# Patient Record
Sex: MALE | Race: OTHER | HISPANIC OR LATINO | URBAN - METROPOLITAN AREA
[De-identification: names, ages, dates, MRNs, and addresses within clinical notes are randomized per-mention and may not be internally consistent; named-entity substitution may affect disease eponyms.]

---

## 2021-09-15 ENCOUNTER — HOSPITAL ENCOUNTER (EMERGENCY)
Facility: HOSPITAL | Age: 36
Discharge: HOME | End: 2021-09-15
Attending: EMERGENCY MEDICINE | Admitting: EMERGENCY MEDICINE

## 2021-09-15 VITALS
DIASTOLIC BLOOD PRESSURE: 87 MMHG | TEMPERATURE: 98.7 F | OXYGEN SATURATION: 97 % | SYSTOLIC BLOOD PRESSURE: 141 MMHG | RESPIRATION RATE: 18 BRPM | HEART RATE: 63 BPM | WEIGHT: 210 LBS

## 2021-09-15 DIAGNOSIS — R51.9 HEADACHE: Primary | ICD-10-CM

## 2021-09-15 PROCEDURE — 99283 EMERGENCY DEPT VISIT LOW MDM: CPT

## 2021-09-15 PROCEDURE — 99281 EMR DPT VST MAYX REQ PHY/QHP: CPT | Performed by: EMERGENCY MEDICINE

## 2021-09-15 NOTE — ED PROVIDER NOTES
History  Chief Complaint   Patient presents with    Headache     as of today pt feels a severe headache with throbbing feeling in the back of the head and/or temporal area  Pt denies and vision changes or trauma      51-year-old male headache no reported past history coming in today with headache  Reports this headache has been intermittent most days over the last 3 years  Reports presenting today due to increased intensity and frequency over the past 3 weeks  States the pain is pressure like and occasionally sharp primarily around right parietal area and occasionally behind right eye without associated vision changes  No maximal in onset and not the worst headache of his life  Denies any associated neuro deficits  Denies any change in discomfort temporarily such as early in the day or with positional changes such as sitting or lying flat  Does report some recent weight loss but was intentional loss  No associated night sweats or fevers  No associated vomiting  Patient reports occasionally taking tylenol but reports her does not like to take medication  Currently asymptomatic and reports head discomfort resolved a short while prior to evaluation  Denies any vision changes, CP, SOB, abd pain, nausea/vomiting, fevers/chills, or any bladder or bowel changes  None       Past Medical History:   Diagnosis Date    No known problems        Past Surgical History:   Procedure Laterality Date    CIRCUMCISION      last year 2020       History reviewed  No pertinent family history  I have reviewed and agree with the history as documented  E-Cigarette/Vaping     E-Cigarette/Vaping Substances     Social History     Tobacco Use    Smoking status: Never Smoker    Smokeless tobacco: Never Used   Substance Use Topics    Alcohol use: Yes     Comment: rare    Drug use: Yes     Types: Marijuana        Review of Systems   Constitutional: Negative for appetite change, chills, diaphoresis and fever     HENT: Negative for congestion, rhinorrhea and sore throat  Eyes: Negative for photophobia and visual disturbance  Respiratory: Negative for chest tightness and shortness of breath  Cardiovascular: Negative for chest pain and leg swelling  Gastrointestinal: Negative for abdominal distention, abdominal pain, blood in stool, constipation, diarrhea, nausea and vomiting  Genitourinary: Negative for difficulty urinating and dysuria  Musculoskeletal: Negative for back pain and joint swelling  Skin: Negative for rash  Neurological: Positive for headaches  Negative for dizziness, weakness, light-headedness and numbness  Psychiatric/Behavioral: Negative for agitation and confusion  All other systems reviewed and are negative  Physical Exam  ED Triage Vitals [09/15/21 1825]   Temperature Pulse Respirations Blood Pressure SpO2   98 7 °F (37 1 °C) 63 18 141/87 97 %      Temp Source Heart Rate Source Patient Position - Orthostatic VS BP Location FiO2 (%)   Tympanic Monitor Sitting Left arm --      Pain Score       --             Orthostatic Vital Signs  Vitals:    09/15/21 1825   BP: 141/87   Pulse: 63   Patient Position - Orthostatic VS: Sitting       Physical Exam  Vitals and nursing note reviewed  Constitutional:       General: He is not in acute distress  Appearance: Normal appearance  He is well-developed  He is not ill-appearing, toxic-appearing or diaphoretic  HENT:      Head: Normocephalic and atraumatic  Nose: Nose normal  No congestion or rhinorrhea  Mouth/Throat:      Mouth: Mucous membranes are moist       Pharynx: Oropharynx is clear  No oropharyngeal exudate or posterior oropharyngeal erythema  Eyes:      General: No scleral icterus  Right eye: No discharge  Left eye: No discharge  Extraocular Movements: Extraocular movements intact  Conjunctiva/sclera: Conjunctivae normal       Pupils: Pupils are equal, round, and reactive to light     Neck:      Vascular: No JVD       Trachea: No tracheal deviation  Cardiovascular:      Rate and Rhythm: Normal rate and regular rhythm  Heart sounds: Normal heart sounds  No murmur heard  No friction rub  No gallop  Comments: Normal rate and regular rhythm  Pulmonary:      Effort: Pulmonary effort is normal  No respiratory distress  Breath sounds: Normal breath sounds  No stridor  No wheezing or rales  Comments: Clear to auscultation bilaterally  Chest:      Chest wall: No tenderness  Abdominal:      General: Bowel sounds are normal  There is no distension  Palpations: Abdomen is soft  Tenderness: There is no abdominal tenderness  There is no right CVA tenderness, left CVA tenderness, guarding or rebound  Comments: Soft, nontender, nondistended  Normal bowel sounds throughout   Musculoskeletal:         General: No swelling, tenderness, deformity or signs of injury  Normal range of motion  Cervical back: Normal range of motion and neck supple  No rigidity  No muscular tenderness  Right lower leg: No edema  Left lower leg: No edema  Lymphadenopathy:      Cervical: No cervical adenopathy  Skin:     General: Skin is warm and dry  Coloration: Skin is not pale  Findings: No erythema or rash  Neurological:      General: No focal deficit present  Mental Status: He is alert and oriented to person, place, and time  Mental status is at baseline  Cranial Nerves: No cranial nerve deficit  Sensory: No sensory deficit  Motor: No weakness or abnormal muscle tone  Coordination: Coordination normal       Gait: Gait normal       Comments: A&Ox3 to person, place, and time  CN 2-12 intact  Strength 5/5 throughout  Sensation grossly intact  Cerebellar exam including gait intact  Psychiatric:         Behavior: Behavior normal          Thought Content:  Thought content normal          ED Medications  Medications - No data to display    Diagnostic Studies  Results Reviewed     None                 No orders to display         Procedures  Procedures      ED Course                             SBIRT 20yo+      Most Recent Value   SBIRT (24 yo +)   In order to provide better care to our patients, we are screening all of our patients for alcohol and drug use  Would it be okay to ask you these screening questions? No Filed at: 09/15/2021 1921                MDM  Number of Diagnoses or Management Options  Headache  Diagnosis management comments: 82-year-old male headache no reported past history coming in today with headache  Currently asymptomatic and without neuro changes or early morning headache or b symptoms  No trauma or anticoagulation  CT very low yield  Patient refusing medications at this time and requesting recommendations  Advised f/u PCP and neurology and referral placed  Medication recommendations  Given instructions and return precautions  All questions answered  Patient acknowledged understanding of all written and verbal instructions and return precautions  Discharged  Amount and/or Complexity of Data Reviewed  Clinical lab tests: reviewed  Tests in the radiology section of CPT®: reviewed  Tests in the medicine section of CPT®: reviewed  Review and summarize past medical records: yes  Independent visualization of images, tracings, or specimens: yes    Risk of Complications, Morbidity, and/or Mortality  Presenting problems: low  Diagnostic procedures: low  Management options: low    Patient Progress  Patient progress: resolved      Disposition  Final diagnoses:   Headache     Time reflects when diagnosis was documented in both MDM as applicable and the Disposition within this note     Time User Action Codes Description Comment    9/15/2021  7:19 PM Nasreen Weinstein Add [R51 9] Headache       ED Disposition     ED Disposition Condition Date/Time Comment    Discharge Stable Wed Sep 15, 2021  7:22 PM Fredi Kehr discharge to home/self care              Follow-up Information     Follow up With Specialties Details Why Contact Info Additional [place]  Schedule an appointment as soon as possible for a visit  [phone]  [facility] [address]     [address] [place] [address] 34 [facility] Emergency Department Emergency Medicine Go to  If symptoms worsen [address]  [address] [facility] [address] Emergency Department, [address], [facility] 108    [facility] Neurology [facility] Neurology Schedule an appointment as soon as possible for a visit in 1 week  [address]  121 [facility] Neurology [facility], [address], [place], 300 [facility]          There are no discharge medications for this patient  PDMP Review     None           ED Provider  Attending physically available and evaluated Jordon Soto I managed the patient along with the ED Attending      Electronically Signed by         Mikki Zafar MD  09/16/21 2753

## 2021-09-15 NOTE — DISCHARGE INSTRUCTIONS
Please follow-up primary care provider and Neurology  Recommend Tylenol 650 mg and ibuprofen 600 mg every 6 hours as needed for pain  Also recommend 400 mg magnesium every day for the next few weeks  Please return for severely worsening headaches, profound weakness, profound sensory changes, fainting, or any other concerning signs or symptoms  Please refer to the following documents for additional instructions and return precautions

## 2021-09-15 NOTE — ED NOTES
Patient reports that he is pain-free and would like to be discharged        Erin Wade RN  09/15/21 7502

## 2021-09-15 NOTE — ED ATTENDING ATTESTATION
9/15/2021  Uriah SKY MD, saw and evaluated the patient  I have discussed the patient with the resident/non-physician practitioner and agree with the resident's/non-physician practitioner's findings, Plan of Care, and MDM as documented in the resident's/non-physician practitioner's note, except where noted  All available labs and Radiology studies were reviewed  I was present for key portions of any procedure(s) performed by the resident/non-physician practitioner and I was immediately available to provide assistance  At this point I agree with the current assessment done in the Emergency Department  I have conducted an independent evaluation of this patient a history and physical is as follows:  Patient with daily headache, chronic and longstanding  Patient states he wakes with it in the morning, but it never wakes him up  States that waxes and wanes in intensity, and can be on either side of his head  No double or blurry vision, changes in concentration, numbness, tingling, or weakness  Denies trauma, neck pain, fevers, or chills  No immunosuppression  Patient has never been seen for the headache  States that it is becoming more recurrent, although overall less intense  He does not take anything for the headache  Review of systems otherwise -12 systems reviewed  On exam Vital signs were reviewed  Patient is awake, alert, interactive  The patient's pupils are equally round reactive to light  Oropharynx is clear with moist mucous membranes  Neck is supple and nontender with no adenopathy or JVD  Heart is regular with no murmurs, rubs, or gallops  Lungs are clear and equal with no wheezes, rales, or rhonchi  Abdomen is soft and nontender with no masses, rebound, or guarding  There is no CVA tenderness  The patient was completely exposed  There is no skin breakdown  There are no rashes or skin changes  Extremities are warm and well perfused with good pulses   The patient has normal strength, sensation, and cranial nerves  Impression:  Primary headache disorder    Plan to treat symptomatically, referred to Neurology, recommend that patient use a headache diary  ED Course         Critical Care Time  Procedures

## 2021-09-16 ENCOUNTER — TELEPHONE (OUTPATIENT)
Dept: NEUROLOGY | Facility: CLINIC | Age: 36
End: 2021-09-16

## 2021-09-16 NOTE — TELEPHONE ENCOUNTER
Best contact number for patient:  [phone]  Emergency Contact name and number:  None  Referring provider and telephone number:  Romelia Ryder [phone]  Primary Care Provider Name and if affiliated with [facility]:   none  Reason for Appointment/Dx:  headaches  Have you seen and followed up with a pediatric Neurologist for this disease in the past?      No (If yes ok to schedule with Dr José Godinez)    Neurology Location patient would like to be seen:  any  Order received? Yes                                                 Records Received? Yes    Have you ever seen another Neurologist?       No    Insurance Information    Insurance Name:[facility]   ID/Policy #:[id number]      Secondary Insurance:    ID/Policy#: Workman's Comp/ Accident/ School  Information      Workman's Comp/Accident/School related?        No    If yes name of Insurance company:    Claim #:    Date of Injury:    Type of Injury:     Name and Telephone Number:    Notes:                   Appointment date:   4/21/2022

## 2021-09-16 NOTE — LETTER
Dante Aguiar  [address] [facility]  [place] [address]      Dear Mr Duron Faustino,    [address] [facility] office has been unsuccessful in trying to reach you by phone regarding:    Office appointment scheduled April 21st, 2022 at 1 p m  Please contact our office at your earliest convenience  Please call [phone]  and follow the prompts        Sincerely,      [facility]

## 2022-04-15 ENCOUNTER — TELEPHONE (OUTPATIENT)
Dept: NEUROLOGY | Facility: CLINIC | Age: 37
End: 2022-04-15

## 2022-04-15 NOTE — TELEPHONE ENCOUNTER
Attempt made to contact patient to finish registration prior to his 4/21/2022 @ 1 pm appointment with Dr Ky Mesa, however, patient's phone number on file is no longer in service  Letter generated and mailed out

## 2022-04-18 ENCOUNTER — TELEPHONE (OUTPATIENT)
Dept: NEUROLOGY | Facility: CLINIC | Age: 37
End: 2022-04-18

## 2022-04-18 NOTE — TELEPHONE ENCOUNTER
Attempted to call patient to confirm his 4/21/2022 @ 1 pm appointment, however, patient's phone number, [phone], is no longer in service

## 2022-04-21 ENCOUNTER — CONSULT (OUTPATIENT)
Dept: NEUROLOGY | Facility: CLINIC | Age: 37
End: 2022-04-21
Payer: COMMERCIAL

## 2022-04-21 VITALS
HEIGHT: 67 IN | SYSTOLIC BLOOD PRESSURE: 110 MMHG | WEIGHT: 200 LBS | BODY MASS INDEX: 31.39 KG/M2 | HEART RATE: 80 BPM | DIASTOLIC BLOOD PRESSURE: 70 MMHG

## 2022-04-21 DIAGNOSIS — R51.9 HEADACHE: ICD-10-CM

## 2022-04-21 PROCEDURE — 99205 OFFICE O/P NEW HI 60 MIN: CPT | Performed by: PSYCHIATRY & NEUROLOGY

## 2022-04-21 RX ORDER — PREDNISONE 10 MG/1
TABLET ORAL
Qty: 18 TABLET | Refills: 0 | Status: SHIPPED | OUTPATIENT
Start: 2022-04-21

## 2022-04-21 NOTE — PROGRESS NOTES
Patient ID: Lula Malhotra is a 31 y o  male  Assessment/Plan:    Headache  Patient is a very pleasant 24-year-old L handed male with history of head trauma and hearing loss who presents for evaluation of headaches  No report of migrainous features  No clear exacerbating/ alleviating factors or concerns for elevated ICP  Patient mostly concerned because of family history of brain tumor  Exam unremarkable with exception of a positive hoffmans sign  Plan:  · Headache possibly due to an occipital neuralgia   · Ordered a short prednisone taper   · Recommended over the counter magnesium oxide   · Can continue taking tylenol as abortive as this has been helping   · MRI with and without contrast ordered  · Counseled on lifestyle modifications   · Given positive hoffmans sign, may consider Imaging of the C spine in the future   · Follow up in 6 months        Diagnoses and all orders for this visit:    Headache  -     Ambulatory referral to Neurology  -     MRI brain with and without contrast; Future  -     predniSONE 10 mg tablet; 3 tabs daily for 3 days, then 2 tabs daily for 3 days, then 1 tab daily for 3 days           Subjective:    HPI     Patient is a very pleasant 24-year-old L handed male with history of head trauma and hearing loss who presents for evaluation of headaches  Patient reports that at baseline gets a left-sided sharp/stabbing infrequent headache that he used to attribute to his history of head trauma as a child  In 2020 patient started  Having 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  Headache located along the superior right parietal area  In August of 2021 his headache became daily for 3 months  Usually worse at night  Went to of 2021 headache was more controlled  Since January of this year he has been getting them more frequently and more painful  Slightly more anterior on the frontal area in both R and L, alternates   As well as more posterior only on the R side  At times behind the eye  Occur 3-4 times a week  The more anterior headaches are described as stabbing whereas the more posterior headache is described as throbbing  He denies any radiation  No triggers or identifiable exacerbating factors  No identifiable alleviating factors  On average 5-6/10, worst 10/10  Denies photophobia, nausea, vomiting, changes in vision, weakness, numbness /tingling, confusion or speech difficulties  No worsening with changes in position  Denies phonophobia, but yarbrough states loug noises make "his ears hurt"  Patient is  hard of hearing  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  He states that it became more pronounced at age of 34-28  Patient reports 3 weeks of right-sided neck pain  Headache is typically well controlled with Tylenol  Reports normal childhood development  Grandmother with history of "cysts in head"  Cousin on his mothers side with history of brain tumor, unclear which  Lifestyle:  Sleeping: uses melatonin, with this gets 6 H,difficulty both falling asleep and staying asleep  Snores  Wakes up tired  Water: 5-6 bottles   Alcohol: No  Smoke: no   Drugs: Marijuana  Occupation: Used to drive [employer]  Denies history of DVT/PE  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[place] [address]  The following portions of the patient's history were reviewed and updated as appropriate: allergies, current medications, past family history, past medical history, past social history, past surgical history and problem list and review of systems  Objective:    Blood pressure 110/70, pulse 80, height 5' 7" (1 702 m), weight 90 7 kg (200 lb)  Physical Exam  Eyes:      General: Lids are normal       Extraocular Movements: Extraocular movements intact  Pupils: Pupils are equal, round, and reactive to light  Neurological:      Coordination: Romberg sign negative        Deep Tendon Reflexes: Strength normal       Reflex Scores: Bicep reflexes are 2+ on the right side and 2+ on the left side  Brachioradialis reflexes are 2+ on the right side and 2+ on the left side  Patellar reflexes are 2+ on the right side and 2+ on the left side  Achilles reflexes are 2+ on the right side and 2+ on the left side  Neurological Exam  Mental Status  Awake, alert and oriented to person, place and time  Cranial Nerves  CN II: Visual acuity is normal  Visual fields full to confrontation  CN III, IV, VI: Extraocular movements intact bilaterally  Normal lids and orbits bilaterally  Pupils equal round and reactive to light bilaterally  CN V: Facial sensation is normal   CN VII: Full and symmetric facial movement  CN VIII: Hearing is normal   CN IX, X: Palate elevates symmetrically  Normal gag reflex  CN XI: Shoulder shrug strength is normal   CN XII: Tongue midline without atrophy or fasciculations  Motor   Strength is 5/5 throughout all four extremities  Reflexes                                           Right                      Left  Brachioradialis                    2+                         2+  Biceps                                 2+                         2+  Patellar                                2+                         2+  Achilles                                2+                         2+    Right pathological reflexes: Crossed adductor absent  Ankle clonus absent  Left pathological reflexes: Crossed adductor absent  Ankle clonus absent  Positive leo   Coordination  Right: Finger-to-nose normal   Left: Finger-to-nose normal     Gait  Casual gait is normal including stance, stride, and arm swing  Normal tandem gait  Romberg is absent  Able to rise from chair without using arms  ROS:    Review of Systems   Constitutional: Negative  Negative for appetite change and fever  HENT: Negative  Negative for hearing loss, tinnitus, trouble swallowing and voice change  Eyes: Negative  Negative for photophobia and pain  Respiratory: Negative  Negative for shortness of breath  Cardiovascular: Negative  Negative for palpitations  Gastrointestinal: Negative  Negative for nausea and vomiting  Endocrine: Negative  Negative for cold intolerance  Genitourinary: Negative  Negative for dysuria, frequency and urgency  Musculoskeletal: Negative  Negative for myalgias and neck pain  Skin: Negative  Negative for rash  Neurological: Negative  Negative for dizziness, tremors, seizures, syncope, facial asymmetry, speech difficulty, weakness, light-headedness, numbness and headaches  Hematological: Negative  Does not bruise/bleed easily  Psychiatric/Behavioral: Negative  Negative for confusion, hallucinations and sleep disturbance

## 2022-04-22 PROBLEM — R51.9 HEADACHE: Status: ACTIVE | Noted: 2022-04-22

## 2022-04-22 NOTE — ASSESSMENT & PLAN NOTE
Patient is a very pleasant 79-year-old L handed male with history of head trauma and hearing loss who presents for evaluation of headaches  No report of migrainous features  No clear exacerbating/ alleviating factors or concerns for elevated ICP  Patient mostly concerned because of family history of brain tumor  Exam unremarkable with exception of a positive hoffmans sign      Plan:  · Headache possibly due to an occipital neuralgia   · Ordered a short prednisone taper   · Recommended over the counter magnesium oxide   · Can continue taking tylenol as abortive as this has been helping   · MRI with and without contrast ordered  · Counseled on lifestyle modifications   · Given positive hoffmans sign, may consider Imaging of the C spine in the future   · Follow up in 6 months

## 2022-05-25 ENCOUNTER — HOSPITAL ENCOUNTER (OUTPATIENT)
Dept: RADIOLOGY | Facility: HOSPITAL | Age: 37
Discharge: HOME | End: 2022-05-25
Payer: COMMERCIAL

## 2022-05-25 DIAGNOSIS — R51.9 HEADACHE: ICD-10-CM

## 2022-05-25 PROCEDURE — 70553 MRI BRAIN STEM W/O & W/DYE: CPT

## 2022-05-25 PROCEDURE — G1004 CDSM NDSC: HCPCS

## 2022-05-25 PROCEDURE — A9585 GADOBUTROL INJECTION: HCPCS | Performed by: PSYCHIATRY & NEUROLOGY

## 2022-05-25 RX ADMIN — GADOBUTROL 9 ML: 604.72 INJECTION INTRAVENOUS at 05:58

## 2022-06-08 ENCOUNTER — TELEPHONE (OUTPATIENT)
Dept: NEUROLOGY | Facility: CLINIC | Age: 37
End: 2022-06-08

## 2022-06-09 NOTE — TELEPHONE ENCOUNTER
Pt made aware of below  Call then had bad connection and call disconnected        Attempted to call pt back, left message to call office if he had any further questions

## 2025-01-10 ENCOUNTER — HOSPITAL ENCOUNTER (EMERGENCY)
Facility: HOSPITAL | Age: 40
Discharge: HOME | End: 2025-01-10
Attending: EMERGENCY MEDICINE
Payer: COMMERCIAL

## 2025-01-10 ENCOUNTER — APPOINTMENT (EMERGENCY)
Dept: RADIOLOGY | Facility: HOSPITAL | Age: 40
End: 2025-01-10
Payer: COMMERCIAL

## 2025-01-10 VITALS
DIASTOLIC BLOOD PRESSURE: 57 MMHG | OXYGEN SATURATION: 96 % | HEART RATE: 86 BPM | SYSTOLIC BLOOD PRESSURE: 116 MMHG | RESPIRATION RATE: 22 BRPM | TEMPERATURE: 99.9 F

## 2025-01-10 DIAGNOSIS — E86.0 DEHYDRATION: ICD-10-CM

## 2025-01-10 DIAGNOSIS — R55 SYNCOPE: ICD-10-CM

## 2025-01-10 DIAGNOSIS — J10.1 INFLUENZA A: Primary | ICD-10-CM

## 2025-01-10 LAB
ALBUMIN SERPL BCG-MCNC: 4.6 G/DL (ref 3.5–5)
ALP SERPL-CCNC: 48 U/L (ref 34–104)
ALT SERPL W P-5'-P-CCNC: 29 U/L (ref 7–52)
ANION GAP SERPL CALCULATED.3IONS-SCNC: 10 MMOL/L (ref 4–13)
AST SERPL W P-5'-P-CCNC: 36 U/L (ref 13–39)
ATRIAL RATE: 86 BPM
BASOPHILS # BLD MANUAL: 0 THOUSAND/UL (ref 0–0.1)
BASOPHILS NFR MAR MANUAL: 0 % (ref 0–1)
BILIRUB SERPL-MCNC: 0.88 MG/DL (ref 0.2–1)
BUN SERPL-MCNC: 19 MG/DL (ref 5–25)
CALCIUM SERPL-MCNC: 9.5 MG/DL (ref 8.4–10.2)
CHLORIDE SERPL-SCNC: 99 MMOL/L (ref 96–108)
CO2 SERPL-SCNC: 27 MMOL/L (ref 21–32)
CREAT SERPL-MCNC: 1.38 MG/DL (ref 0.6–1.3)
EOSINOPHIL # BLD MANUAL: 0.21 THOUSAND/UL (ref 0–0.4)
EOSINOPHIL NFR BLD MANUAL: 3 % (ref 0–6)
ERYTHROCYTE [DISTWIDTH] IN BLOOD BY AUTOMATED COUNT: 12.5 % (ref 11.6–15.1)
FLUAV AG UPPER RESP QL IA.RAPID: POSITIVE
FLUBV AG UPPER RESP QL IA.RAPID: NEGATIVE
GFR SERPL CREATININE-BSD FRML MDRD: 64 ML/MIN/1.73SQ M
GLUCOSE SERPL-MCNC: 146 MG/DL (ref 65–140)
HCT VFR BLD AUTO: 50.3 % (ref 36.5–49.3)
HGB BLD-MCNC: 17.8 G/DL (ref 12–17)
LYMPHOCYTES # BLD AUTO: 0.57 THOUSAND/UL (ref 0.6–4.47)
LYMPHOCYTES # BLD AUTO: 5 % (ref 14–44)
MCH RBC QN AUTO: 30.6 PG (ref 26.8–34.3)
MCHC RBC AUTO-ENTMCNC: 35.4 G/DL (ref 31.4–37.4)
MCV RBC AUTO: 87 FL (ref 82–98)
MONOCYTES # BLD AUTO: 0.64 THOUSAND/UL (ref 0–1.22)
MONOCYTES NFR BLD: 9 % (ref 4–12)
NEUTROPHILS # BLD MANUAL: 5.69 THOUSAND/UL (ref 1.85–7.62)
NEUTS BAND NFR BLD MANUAL: 16 % (ref 0–8)
NEUTS SEG NFR BLD AUTO: 64 % (ref 43–75)
P AXIS: 68 DEGREES
PLATELET # BLD AUTO: 147 THOUSANDS/UL (ref 149–390)
PLATELET BLD QL SMEAR: ADEQUATE
PMV BLD AUTO: 10.8 FL (ref 8.9–12.7)
POTASSIUM SERPL-SCNC: 3.6 MMOL/L (ref 3.5–5.3)
PR INTERVAL: 126 MS
PROT SERPL-MCNC: 8.4 G/DL (ref 6.4–8.4)
QRS AXIS: 46 DEGREES
QRSD INTERVAL: 88 MS
QT INTERVAL: 322 MS
QTC INTERVAL: 385 MS
RBC # BLD AUTO: 5.81 MILLION/UL (ref 3.88–5.62)
RBC MORPH BLD: NORMAL
SARS-COV+SARS-COV-2 AG RESP QL IA.RAPID: NEGATIVE
SODIUM SERPL-SCNC: 136 MMOL/L (ref 135–147)
T WAVE AXIS: 59 DEGREES
VARIANT LYMPHS # BLD AUTO: 3 %
VENTRICULAR RATE: 86 BPM
WBC # BLD AUTO: 7.11 THOUSAND/UL (ref 4.31–10.16)

## 2025-01-10 PROCEDURE — 99285 EMERGENCY DEPT VISIT HI MDM: CPT | Performed by: EMERGENCY MEDICINE

## 2025-01-10 PROCEDURE — 85027 COMPLETE CBC AUTOMATED: CPT | Performed by: EMERGENCY MEDICINE

## 2025-01-10 PROCEDURE — 93005 ELECTROCARDIOGRAM TRACING: CPT

## 2025-01-10 PROCEDURE — 85007 BL SMEAR W/DIFF WBC COUNT: CPT | Performed by: EMERGENCY MEDICINE

## 2025-01-10 PROCEDURE — 99284 EMERGENCY DEPT VISIT MOD MDM: CPT

## 2025-01-10 PROCEDURE — 87811 SARS-COV-2 COVID19 W/OPTIC: CPT | Performed by: EMERGENCY MEDICINE

## 2025-01-10 PROCEDURE — 87804 INFLUENZA ASSAY W/OPTIC: CPT | Performed by: EMERGENCY MEDICINE

## 2025-01-10 PROCEDURE — 71046 X-RAY EXAM CHEST 2 VIEWS: CPT

## 2025-01-10 PROCEDURE — 36415 COLL VENOUS BLD VENIPUNCTURE: CPT | Performed by: EMERGENCY MEDICINE

## 2025-01-10 PROCEDURE — 96374 THER/PROPH/DIAG INJ IV PUSH: CPT

## 2025-01-10 PROCEDURE — 80053 COMPREHEN METABOLIC PANEL: CPT | Performed by: EMERGENCY MEDICINE

## 2025-01-10 PROCEDURE — 96361 HYDRATE IV INFUSION ADD-ON: CPT

## 2025-01-10 RX ORDER — KETOROLAC TROMETHAMINE 30 MG/ML
15 INJECTION, SOLUTION INTRAMUSCULAR; INTRAVENOUS ONCE
Status: COMPLETED | OUTPATIENT
Start: 2025-01-10 | End: 2025-01-10

## 2025-01-10 RX ORDER — OSELTAMIVIR PHOSPHATE 75 MG/1
75 CAPSULE ORAL EVERY 12 HOURS
Qty: 10 CAPSULE | Refills: 0 | Status: SHIPPED | OUTPATIENT
Start: 2025-01-10 | End: 2025-01-15

## 2025-01-10 RX ADMIN — SODIUM CHLORIDE 1000 ML: 0.9 INJECTION, SOLUTION INTRAVENOUS at 08:42

## 2025-01-10 RX ADMIN — KETOROLAC TROMETHAMINE 15 MG: 30 INJECTION, SOLUTION INTRAMUSCULAR; INTRAVENOUS at 09:43

## 2025-01-10 NOTE — ED NOTES
Orthostatic vital signs   Lying- 139/67 HR 91  Sitting- 140/75 HR 89  Standing- 139/78 HR 98     Nat Mann  01/10/25 0871

## 2025-01-10 NOTE — ED PROVIDER NOTES
"Time reflects when diagnosis was documented in both MDM as applicable and the Disposition within this note       Time User Action Codes Description Comment    1/10/2025  9:06 AM Juan Templeton Add [J10.1] Influenza A     1/10/2025  9:06 AM Juan Templeton Add [R55] Syncope     1/10/2025  9:06 AM Juan Templeton Add [E86.0] Dehydration           ED Disposition       ED Disposition   Discharge    Condition   Stable    Date/Time   Fri Herbert 10, 2025  9:06 AM    Comment   Quintin Diallo discharge to home/self care.                   Assessment & Plan       Medical Decision Making  Background: 39 y.o. male presents with uri symptoms, syncope    Differential DX includes but is not limited to: viral disease, pneumonia, bronchitis, dehydration, arrhythmia, metabolic derangement, doubt acs, doubt pe     Plan: cbc, cmp, EKG, viral swab, chest xray, symptomatic treatment       Amount and/or Complexity of Data Reviewed  Labs: ordered.  Radiology: ordered.    Risk  Prescription drug management.        ED Course as of 01/10/25 0946   Fri Herbert 10, 2025   0906 Patient is clinically dehydrated (dry mucus membranes, borderline tachycardia).  Will treat with IVF pending lab results.        Medications   sodium chloride 0.9 % bolus 1,000 mL (1,000 mL Intravenous New Bag 1/10/25 0842)   ketorolac (TORADOL) injection 15 mg (15 mg Intravenous Given 1/10/25 0943)       ED Risk Strat Scores                                              History of Present Illness       Chief Complaint   Patient presents with    Syncope     Pt reports \"I thought I layed down to sleep, but my brother says he heard a thud so I must have passed out\" pt tachypnic and generally feels unwell.        Past Medical History:   Diagnosis Date    No known problems       Past Surgical History:   Procedure Laterality Date    CIRCUMCISION      last year 2020      History reviewed. No pertinent family history.   Social History     Tobacco Use    Smoking status: Never    Smokeless " tobacco: Never   Substance Use Topics    Alcohol use: Yes     Comment: rare    Drug use: Yes     Types: Marijuana     Comment: daily      E-Cigarette/Vaping      E-Cigarette/Vaping Substances      I have reviewed and agree with the history as documented.     Quintin is a 39 y.o. male who presents for evaluation after a syncopal episode at home.  He reports waking up this morning feeling very lightheaded and had a dry mouth.  He went down to his kitchen and drank some water and juice but continued to feel lightheaded. He then syncopized.  His brother helped him up.  No post-episode confusion.  No tremors or seizure like activity. He has been having uri symptoms for a few days.   He took some otc medications prior to sleep.  He reports prodromal symptoms of feeling flushed/sweaty and sob prior to syncope.        History provided by:  Patient   used: No    Syncope  Episode history:  Single  Most recent episode:  Today  Associated symptoms: fever        Review of Systems   Constitutional:  Positive for fatigue and fever.   HENT:  Positive for congestion.    Respiratory:  Positive for cough.    Cardiovascular:  Positive for syncope.   Neurological:  Positive for syncope and light-headedness.           Objective       ED Triage Vitals [01/10/25 0817]   Temperature Pulse Blood Pressure Respirations SpO2 Patient Position - Orthostatic VS   99.9 °F (37.7 °C) 95 136/78 (!) 28 97 % Lying      Temp Source Heart Rate Source BP Location FiO2 (%) Pain Score    Oral Monitor Right arm -- No Pain      Vitals      Date and Time Temp Pulse SpO2 Resp BP Pain Score FACES Pain Rating User   01/10/25 0834 -- 98 -- -- 139/78 -- -- TA   01/10/25 0832 -- 89 -- -- 140/75 -- -- TA   01/10/25 0831 -- 91 -- -- 139/67 -- -- TA   01/10/25 0817 99.9 °F (37.7 °C) 95 97 % 28 136/78 No Pain -- RJK            Physical Exam  Vitals and nursing note reviewed.   Constitutional:       General: He is in acute distress.      Appearance: He  is well-developed.   HENT:      Head: Normocephalic and atraumatic.      Comments: Hard of Hearing     Mouth/Throat:      Mouth: Mucous membranes are dry.   Eyes:      Pupils: Pupils are equal, round, and reactive to light.   Neck:      Vascular: No JVD.   Cardiovascular:      Rate and Rhythm: Normal rate and regular rhythm.      Heart sounds: Normal heart sounds. No murmur heard.     No friction rub. No gallop.   Pulmonary:      Effort: Pulmonary effort is normal. No respiratory distress.      Breath sounds: Normal breath sounds. No wheezing or rales.   Chest:      Chest wall: No tenderness.   Musculoskeletal:         General: No tenderness. Normal range of motion.      Cervical back: Normal range of motion.   Skin:     General: Skin is warm and dry.   Neurological:      General: No focal deficit present.      Mental Status: He is alert and oriented to person, place, and time.   Psychiatric:         Behavior: Behavior normal.         Thought Content: Thought content normal.         Judgment: Judgment normal.         Results Reviewed       Procedure Component Value Units Date/Time    Comprehensive metabolic panel [[id number]]  (Abnormal) Collected: 01/10/25 0842    Lab Status: Final result Specimen: Blood from Arm, Right Updated: 01/10/25 0903     Sodium 136 mmol/L      Potassium 3.6 mmol/L      Chloride 99 mmol/L      CO2 27 mmol/L      ANION GAP 10 mmol/L      BUN 19 mg/dL      Creatinine 1.38 mg/dL      Glucose 146 mg/dL      Calcium 9.5 mg/dL      AST 36 U/L      ALT 29 U/L      Alkaline Phosphatase 48 U/L      Total Protein 8.4 g/dL      Albumin 4.6 g/dL      Total Bilirubin 0.88 mg/dL      eGFR 64 ml/min/1.73sq m     Narrative:      National Kidney Disease Foundation guidelines for Chronic Kidney Disease (CKD):     Stage 1 with normal or high GFR (GFR > 90 mL/min/1.73 square meters)    Stage 2 Mild CKD (GFR = 60-89 mL/min/1.73 square meters)    Stage 3A Moderate CKD (GFR = 45-59 mL/min/1.73 square meters)     Stage 3B Moderate CKD (GFR = 30-44 mL/min/1.73 square meters)    Stage 4 Severe CKD (GFR = 15-29 mL/min/1.73 square meters)    Stage 5 End Stage CKD (GFR <15 mL/min/1.73 square meters)  Note: GFR calculation is accurate only with a steady state creatinine    FLU/COVID Rapid Antigen (30 min. TAT) - Preferred screening test in ED [[id number]]  (Abnormal) Collected: 01/10/25 0831    Lab Status: Final result Specimen: Nares from Nose Updated: 01/10/25 0856     SARS COV Rapid Antigen Negative     Influenza A Rapid Antigen Positive     Influenza B Rapid Antigen Negative    Narrative:      This test has been performed using the [employer] Hilda 2 FLU+SARS Antigen test under the Emergency Use Authorization (EUA). This test has been validated by the  and verified by the performing laboratory. The Hilda uses lateral flow immunofluorescent sandwich assay to detect SARS-COV, Influenza A and Influenza B Antigen.     The [employer] Hilda 2 SARS Antigen test does not differentiate between SARS-CoV and SARS-CoV-2.     Negative results are presumptive and may be confirmed with a molecular assay, if necessary, for patient management. Negative results do not rule out SARS-CoV-2 or influenza infection and should not be used as the sole basis for treatment or patient management decisions. A negative test result may occur if the level of antigen in a sample is below the limit of detection of this test.     Positive results are indicative of the presence of viral antigens, but do not rule out bacterial infection or co-infection with other viruses.     All test results should be used as an adjunct to clinical observations and other information available to the provider.    FOR PEDIATRIC PATIENTS - copy/paste COVID Guidelines URL to browser: [email or web address]    CBC and differential [[id number]]  (Abnormal) Collected: 01/10/25 0842    Lab Status: Preliminary result Specimen: Blood  from Arm, Right Updated: 01/10/25 0849     WBC 7.11 Thousand/uL      RBC 5.81 Million/uL      Hemoglobin 17.8 g/dL      Hematocrit 50.3 %      MCV 87 fL      MCH 30.6 pg      MCHC 35.4 g/dL      RDW 12.5 %      MPV 10.8 fL      Platelets 147 Thousands/uL             XR chest 2 views   ED Interpretation by Juan Templeton MD (01/10 0916)   This film was interpreted independently by me.  No infiltrate, cardiac silhouette normal, no pleural effusions or pulmonary edema.         Final Interpretation by Jonatan Rebolledo MD (01/10 0905)      No acute cardiopulmonary disease.            Workstation performed: [id number]             ECG 12 Lead Documentation Only    Date/Time: 1/10/2025 9:11 AM    Performed by: Juan Templeton MD  Authorized by: Juan Templeton MD    Indications / Diagnosis:  Syncope  ECG reviewed by me, the ED Provider: yes    Patient location:  ED  Previous ECG:     Previous ECG:  Unavailable  Interpretation:     Interpretation: normal    Rate:     ECG rate:  86    ECG rate assessment: normal    Rhythm:     Rhythm: sinus rhythm    Ectopy:     Ectopy: none    QRS:     QRS axis:  Normal    QRS intervals:  Normal  Conduction:     Conduction: normal    ST segments:     ST segments:  Normal  T waves:     T waves: normal        ED Medication and Procedure Management   Prior to Admission Medications   Prescriptions Last Dose Informant Patient Reported? Taking?   predniSONE 10 mg tablet   No No   Sig: 3 tabs daily for 3 days, then 2 tabs daily for 3 days, then 1 tab daily for 3 days      Facility-Administered Medications: None     Patient's Medications   Discharge Prescriptions    OSELTAMIVIR (TAMIFLU) 75 MG CAPSULE    Take 1 capsule (75 mg total) by mouth every 12 (twelve) hours for 5 days       Start Date: 1/10/2025 End Date: 1/15/2025       Order Dose: 75 mg       Quantity: 10 capsule    Refills: 0     No discharge procedures on file.  ED SEPSIS DOCUMENTATION   Time reflects when diagnosis was  documented in both MDM as applicable and the Disposition within this note       Time User Action Codes Description Comment    1/10/2025  9:06 AM Juan Templeton [J10.1] Influenza A     1/10/2025  9:06 AM Juan Templeton [R55] Syncope     1/10/2025  9:06 AM Juan Templeton [E86.0] Dehydration                  Juan Templeton MD  01/10/25 0947

## 2025-01-10 NOTE — Clinical Note
Quintin Diallo was seen and treated in our emergency department on 1/10/2025.    No restrictions            Diagnosis: Acute Influenza A    Quintin  may return to work on return date.    He may return on this date: 01/13/2025         If you have any questions or concerns, please don't hesitate to call.      Juan Templeton MD    ______________________________           _______________          _______________  Hospital Representative                              Date                                Time

## 2025-01-21 ENCOUNTER — HOSPITAL ENCOUNTER (EMERGENCY)
Facility: HOSPITAL | Age: 40
Discharge: HOME | End: 2025-01-21
Attending: EMERGENCY MEDICINE
Payer: COMMERCIAL

## 2025-01-21 VITALS
SYSTOLIC BLOOD PRESSURE: 143 MMHG | RESPIRATION RATE: 16 BRPM | DIASTOLIC BLOOD PRESSURE: 85 MMHG | OXYGEN SATURATION: 97 % | TEMPERATURE: 98.2 F | HEART RATE: 84 BPM

## 2025-01-21 DIAGNOSIS — H10.9 BILATERAL CONJUNCTIVITIS: Primary | ICD-10-CM

## 2025-01-21 PROCEDURE — 99284 EMERGENCY DEPT VISIT MOD MDM: CPT | Performed by: EMERGENCY MEDICINE

## 2025-01-21 PROCEDURE — 99282 EMERGENCY DEPT VISIT SF MDM: CPT

## 2025-01-21 RX ORDER — TETRACAINE HYDROCHLORIDE 5 MG/ML
1 SOLUTION OPHTHALMIC ONCE
Status: COMPLETED | OUTPATIENT
Start: 2025-01-21 | End: 2025-01-21

## 2025-01-21 RX ORDER — OFLOXACIN 3 MG/ML
1 SOLUTION/ DROPS OPHTHALMIC 4 TIMES DAILY
Qty: 10 ML | Refills: 0 | Status: SHIPPED | OUTPATIENT
Start: 2025-01-21

## 2025-01-21 RX ADMIN — TETRACAINE HYDROCHLORIDE 1 DROP: 5 SOLUTION OPHTHALMIC at 07:34

## 2025-01-21 RX ADMIN — FLUORESCEIN SODIUM 2 STRIP: 1 STRIP OPHTHALMIC at 07:34

## 2025-01-21 NOTE — ED PROVIDER NOTES
Time reflects when diagnosis was documented in both MDM as applicable and the Disposition within this note       Time User Action Codes Description Comment    1/21/2025  7:39 AM Juan Templeton Add [H10.9] Bilateral conjunctivitis           ED Disposition       ED Disposition   Discharge    Condition   Stable    Date/Time   Tue Jan 21, 2025  8:07 AM    Comment   Quintin Diallo discharge to home/self care.                   Assessment & Plan       Medical Decision Making  Patient presents with:  Bilateral eye swelling, itchiness that started yesterday at 6 pm. Patient states yellow discharge and crusting that occurs when first wakes up. Patient has been taking saline eye drops without relief. No pertinent PMH. 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 Patient sleeps with his dog in the same bed. Patient smokes marijuana once a day. Denies illicit substance use or ETOH use. Patient had the flu 2 weeks ago that resolved 1 week ago. Patient denies change in vision, pain with eye movement.    Patient seen and examined noted to have bilateral injected conjunctiva, no foreign bodies, 20/20 vision bilaterally, and normal eye pressures bilaterally.      Differential diagnosis includes but is not limited to viral conjunctivitis, bacterial conjunctivitis, ultraviolet keratitis.       Patient appears well, is nontoxic appearing, Quintin Diallo expresses understanding and agrees with plan of care at this time.  In light of this patient would benefit from outpatient management.    Patient was rx'd as below       Risk  Prescription drug management.             Medications   tetracaine 0.5 % ophthalmic solution 1 drop (1 drop Both Eyes Given 1/21/25 0734)   fluorescein sodium sterile ophthalmic strip 2 strip (2 strips Both Eyes Given 1/21/25 0734)       ED Risk Strat Scores                                              History of Present Illness        Chief Complaint   Patient presents with    Eye Redness     Pt reports onset of eye redness and discharge onset last night, worsening today       Past Medical History:   Diagnosis Date    No known problems       Past Surgical History:   Procedure Laterality Date    CIRCUMCISION      last year 2020      History reviewed. No pertinent family history.   Social History     Tobacco Use    Smoking status: Never    Smokeless tobacco: Never   Substance Use Topics    Alcohol use: Yes     Comment: rare    Drug use: Yes     Types: Marijuana     Comment: daily      E-Cigarette/Vaping      E-Cigarette/Vaping Substances      I have reviewed and agree with the history as documented.     Quintin Diallo is a 39 y.o. male     They presented to the emergency department on January 21, 2025. Patient presents with:  Bilateral eye swelling, itchiness that started yesterday at 6 pm. Patient states yellow discharge and crusting that occurs when first wakes up. Patient has been taking saline eye drops without relief. No pertinent PMH. 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 Patient sleeps with his dog in the same bed. Patient smokes marijuana once a day. Denies illicit substance use or ETOH use. Patient had the flu 2 weeks ago that resolved 1 week ago. Patient denies change in vision, pain with eye movement, headache, fever, chills, cough, SOB, abdominal pain, nausea, vomiting, diarrhea, constipation, dysuria, polyuria, hematuria, rash, or any other complaint at this time.               Review of Systems   Constitutional:  Negative for chills and fever.   HENT:  Negative for ear pain and sore throat.    Eyes:  Positive for pain, discharge, redness and itching. Negative for visual disturbance.   Respiratory:  Negative for cough and shortness of breath.    Cardiovascular:  Negative for chest pain and palpitations.   Gastrointestinal:  Negative for  abdominal pain, constipation, diarrhea, nausea and vomiting.   Genitourinary:  Negative for dysuria and hematuria.   Musculoskeletal:  Negative for arthralgias and back pain.   Skin:  Negative for color change and rash.   Neurological:  Negative for seizures and syncope.   All other systems reviewed and are negative.          Objective       ED Triage Vitals   Temperature Pulse Blood Pressure Respirations SpO2 Patient Position - Orthostatic VS   01/21/25 0703 01/21/25 0704 01/21/25 0704 01/21/25 0704 01/21/25 0704 01/21/25 0704   98.2 °F (36.8 °C) 84 143/85 16 97 % Sitting      Temp Source Heart Rate Source BP Location FiO2 (%) Pain Score    01/21/25 0703 01/21/25 0704 -- -- --    Oral Monitor         Vitals      Date and Time Temp Pulse SpO2 Resp BP Pain Score FACES Pain Rating User   01/21/25 0704 -- 84 Simultaneous filing. User may not have seen previous data. 97 % 16 Simultaneous filing. User may not have seen previous data. 143/85 Simultaneous filing. User may not have seen previous data. -- -- MD   01/21/25 0703 98.2 °F (36.8 °C) -- -- -- -- -- -- AB            Physical Exam  Vitals and nursing note reviewed.   Constitutional:       General: He is not in acute distress.     Appearance: He is well-developed.   HENT:      Head: Normocephalic and atraumatic.   Eyes:      General: Lids are normal. Lids are everted, no foreign bodies appreciated. No visual field deficit or scleral icterus.     Intraocular pressure: Right eye pressure is 15 mmHg. Left eye pressure is 17 mmHg. Measurements were taken using an automated tonometer.     Extraocular Movements: Extraocular movements intact.      Right eye: Normal extraocular motion.      Left eye: Normal extraocular motion.      Conjunctiva/sclera:      Right eye: Right conjunctiva is injected. No chemosis or hemorrhage.     Left eye: Left conjunctiva is injected. No chemosis or hemorrhage.     Pupils: Pupils are equal, round, and reactive to light.      Funduscopic  exam:     Right eye: No AV nicking, arteriolar narrowing or papilledema.         Left eye: No AV nicking, arteriolar narrowing or papilledema.      Visual Fields: Right eye visual fields normal and left eye visual fields normal.      Comments: 20/20 vision in bilateral eyes   Cardiovascular:      Rate and Rhythm: Normal rate and regular rhythm.      Pulses: Normal pulses.      Heart sounds: Normal heart sounds. No murmur heard.     No friction rub. No gallop.   Pulmonary:      Effort: Pulmonary effort is normal. No respiratory distress.      Breath sounds: Normal breath sounds. No stridor. No wheezing, rhonchi or rales.   Musculoskeletal:      Cervical back: Neck supple.   Skin:     General: Skin is warm and dry.      Capillary Refill: Capillary refill takes less than 2 seconds.   Neurological:      Mental Status: He is alert.   Psychiatric:         Mood and Affect: Mood normal.         Results Reviewed       None            No orders to display       Procedures    ED Medication and Procedure Management   Prior to Admission Medications   Prescriptions Last Dose Informant Patient Reported? Taking?   predniSONE 10 mg tablet   No No   Sig: 3 tabs daily for 3 days, then 2 tabs daily for 3 days, then 1 tab daily for 3 days      Facility-Administered Medications: None     Discharge Medication List as of 1/21/2025  8:07 AM        START taking these medications    Details   ofloxacin (OCUFLOX) 0.3 % ophthalmic solution Administer 1 drop to both eyes 4 (four) times a day, Starting Tue 1/21/2025, Normal           CONTINUE these medications which have NOT CHANGED    Details   predniSONE 10 mg tablet 3 tabs daily for 3 days, then 2 tabs daily for 3 days, then 1 tab daily for 3 days, Normal             ED SEPSIS DOCUMENTATION   Time reflects when diagnosis was documented in both MDM as applicable and the Disposition within this note       Time User Action Codes Description Comment    1/21/2025  7:39 AM Juan Templeton Add [H10.9]  Bilateral conjunctivitis                  Gabino Coburn MD  01/21/25 3646

## 2025-01-21 NOTE — Clinical Note
Quintin Diallo was seen and treated in our emergency department on 1/21/2025.    No restrictions            Diagnosis: Acute Bilateral Conjunctivitis    Quintin  may return to work on return date.    He may return on this date: 01/22/2025         If you have any questions or concerns, please don't hesitate to call.      Gabino Coburn MD    ______________________________           _______________          _______________  Hospital Representative                              Date                                Time

## 2025-01-21 NOTE — ED ATTENDING ATTESTATION
1/21/2025  I, Juan Templeton MD, saw and evaluated the patient. I have discussed the patient with the resident/non-physician practitioner and agree with the resident's/non-physician practitioner's findings, Plan of Care, and MDM as documented in the resident's/non-physician practitioner's note, except where noted. All available labs and Radiology studies were reviewed.  I was present for key portions of any procedure(s) performed by the resident/non-physician practitioner and I was immediately available to provide assistance.       At this point I agree with the current assessment done in the Emergency Department.  I have conducted an independent evaluation of this patient a history and physical is as follows:    S:  Chief Complaint   Patient presents with    Eye Redness     Pt reports onset of eye redness and discharge onset last night, worsening today     Quintin is a 39 y.o. male who presents with the chief complaint of bilateral eye redness, itchiness and discharge.  Onset was yesterday.  This morning woke with his eyes crusted.  No change in vision.  No real pain.  No photophobia.  No recent uv exposure.     O:  ED Triage Vitals   Temperature Pulse Respirations Blood Pressure SpO2   01/21/25 0703 01/21/25 0704 01/21/25 0704 01/21/25 0704 01/21/25 0704   98.2 °F (36.8 °C) 84 16 143/85 97 %      Temp Source Heart Rate Source Patient Position - Orthostatic VS BP Location FiO2 (%)   01/21/25 0703 01/21/25 0704 01/21/25 0704 -- --   Oral Monitor Sitting        Pain Score       --                Physical Exam  Vitals and nursing note reviewed.   Constitutional:       General: He is in acute distress (mild).      Appearance: He is well-developed.   HENT:      Head: Normocephalic and atraumatic.   Eyes:      Extraocular Movements:      Right eye: Normal extraocular motion.      Left eye: Normal extraocular motion.      Conjunctiva/sclera:      Right eye: Right conjunctiva is injected. Exudate present.      Left  eye: Left conjunctiva is injected. Exudate present.      Pupils: Pupils are equal, round, and reactive to light.      Right eye: No fluorescein uptake.      Left eye: No fluorescein uptake.   Neck:      Vascular: No JVD.   Cardiovascular:      Rate and Rhythm: Normal rate and regular rhythm.      Heart sounds: Normal heart sounds. No murmur heard.     No friction rub. No gallop.   Pulmonary:      Effort: Pulmonary effort is normal. No respiratory distress.      Breath sounds: Normal breath sounds. No wheezing or rales.   Chest:      Chest wall: No tenderness.   Musculoskeletal:         General: No tenderness. Normal range of motion.      Cervical back: Normal range of motion.   Skin:     General: Skin is warm and dry.   Neurological:      General: No focal deficit present.      Mental Status: He is alert and oriented to person, place, and time.   Psychiatric:         Behavior: Behavior normal.         Thought Content: Thought content normal.         Judgment: Judgment normal.       A/P:  39 y.o. male patient with s/s of conjunctivitis, bacterial vs. Viral.  Will treat as bacterial with topical ointment (will provide symptomatic relief for viral, potential curative for bacterial).       ED Course     Medications   tetracaine 0.5 % ophthalmic solution 1 drop (1 drop Both Eyes Given 1/21/25 0734)   fluorescein sodium sterile ophthalmic strip 2 strip (2 strips Both Eyes Given 1/21/25 0734)         Critical Care Time  Procedures  Time reflects when diagnosis was documented in both MDM as applicable and the Disposition within this note       Time User Action Codes Description Comment    1/21/2025  7:39 AM Juan Templeton Add [H10.9] Bilateral conjunctivitis           ED Disposition       ED Disposition   Discharge    Condition   Stable    Date/Time   Tue Jan 21, 2025  8:07 AM    Comment   Quintin Diallo discharge to home/self care.                   Follow-up Information       Follow up With Specialties Details Why Contact  Info Additional Information    [facility] Schedule an appointment as soon as possible for a visit in 1 week  [address] [place] [id number] [phone] [facility], [address] [place] [address], [id number] [phone]

## 2025-01-22 LAB
ATRIAL RATE: 86 BPM
P AXIS: 68 DEGREES
PR INTERVAL: 126 MS
QRS AXIS: 46 DEGREES
QRSD INTERVAL: 88 MS
QT INTERVAL: 322 MS
QTC INTERVAL: 385 MS
T WAVE AXIS: 59 DEGREES
VENTRICULAR RATE: 86 BPM

## 2025-01-22 PROCEDURE — 93010 ELECTROCARDIOGRAM REPORT: CPT | Performed by: STUDENT IN AN ORGANIZED HEALTH CARE EDUCATION/TRAINING PROGRAM

## 2025-01-28 ENCOUNTER — OFFICE VISIT (OUTPATIENT)
Dept: FAMILY MEDICINE CLINIC | Facility: CLINIC | Age: 40
End: 2025-01-28
Payer: COMMERCIAL

## 2025-01-28 VITALS
BODY MASS INDEX: 28.85 KG/M2 | RESPIRATION RATE: 16 BRPM | DIASTOLIC BLOOD PRESSURE: 94 MMHG | OXYGEN SATURATION: 96 % | HEIGHT: 67 IN | HEART RATE: 82 BPM | SYSTOLIC BLOOD PRESSURE: 134 MMHG | TEMPERATURE: 98.1 F | WEIGHT: 183.8 LBS

## 2025-01-28 DIAGNOSIS — G47.09 OTHER INSOMNIA: Primary | ICD-10-CM

## 2025-01-28 DIAGNOSIS — H10.9 BILATERAL CONJUNCTIVITIS: ICD-10-CM

## 2025-01-28 PROCEDURE — 99213 OFFICE O/P EST LOW 20 MIN: CPT | Performed by: FAMILY MEDICINE

## 2025-01-28 RX ORDER — HYDROXYZINE HYDROCHLORIDE 25 MG/1
25 TABLET, FILM COATED ORAL
Qty: 30 TABLET | Refills: 1 | Status: SHIPPED | OUTPATIENT
Start: 2025-01-28

## 2025-01-28 NOTE — PROGRESS NOTES
"Name: Quintin Diallo      : 1985      MRN: [id number]  Encounter Provider: Sergey Brody DO  Encounter Date: 2025   Encounter department: [facility]  :  Assessment & Plan  Bilateral conjunctivitis    Orders:    Ambulatory Referral to Family Practice    Other insomnia    Orders:    hydrOXYzine HCL (ATARAX) 25 mg tablet; Take 1 tablet (25 mg total) by mouth daily at bedtime      Trial of Atarax 25 mg  - help get to sleep.          Chief Complaint   Patient presents with    ER follow up 1/10/25 - Influenza A; 25 pink eye    Insomnia         History of Present Illness   ER folloow up, eyes cleared.  Has mild cough.  Problem getting asleep- thinking in bed too much.  SH; [employer] at [employer].      Review of Systems   Constitutional: Negative.    HENT: Negative.     Eyes: Negative.    Respiratory: Negative.     Cardiovascular: Negative.    Gastrointestinal: Negative.    Genitourinary: Negative.    Musculoskeletal: Negative.    Skin: Negative.    Neurological: Negative.    Psychiatric/Behavioral: Negative.         Objective   /94 (BP Location: Left arm, Patient Position: Sitting, Cuff Size: Standard)   Pulse 82   Temp 98.1 °F (36.7 °C) (Temporal)   Resp 16   Ht 5' 7\" (1.702 m)   Wt 83.4 kg (183 lb 12.8 oz)   SpO2 96%   BMI 28.79 kg/m²    BMI Counseling: Body mass index is 28.79 kg/m². The BMI is above normal. Nutrition recommendations include reducing portion sizes and consuming healthier snacks.  Physical Exam  Constitutional:       Appearance: He is well-developed.   HENT:      Head: Normocephalic and atraumatic.      Right Ear: Tympanic membrane, ear canal and external ear normal.      Left Ear: Tympanic membrane, ear canal and external ear normal.      Nose: Nose normal.      Mouth/Throat:      Mouth: Mucous membranes are moist.      Pharynx: Oropharynx is clear.   Eyes:      Conjunctiva/sclera: Conjunctivae normal.      Pupils: Pupils are equal, round, and reactive to light. "   Cardiovascular:      Rate and Rhythm: Normal rate and regular rhythm.      Heart sounds: Normal heart sounds.   Pulmonary:      Effort: Pulmonary effort is normal.      Breath sounds: Normal breath sounds.   Musculoskeletal:      Cervical back: Normal range of motion and neck supple.   Skin:     General: Skin is warm and dry.   Neurological:      Mental Status: He is alert and oriented to person, place, and time.      Deep Tendon Reflexes: Reflexes are normal and symmetric.   Psychiatric:         Mood and Affect: Mood normal.         Behavior: Behavior normal.         Thought Content: Thought content normal.         Judgment: Judgment normal.